# Patient Record
Sex: FEMALE | HISPANIC OR LATINO | URBAN - METROPOLITAN AREA
[De-identification: names, ages, dates, MRNs, and addresses within clinical notes are randomized per-mention and may not be internally consistent; named-entity substitution may affect disease eponyms.]

---

## 2024-06-21 ENCOUNTER — OFFICE VISIT (OUTPATIENT)
Dept: URBAN - METROPOLITAN AREA CLINIC 60 | Facility: CLINIC | Age: 49
End: 2024-06-21

## 2024-06-21 RX ORDER — OFLOXACIN 3 MG/ML
10 DROPS IN AFFECTED EAR TWICE A DAY X10 DAYS SOLUTION AURICULAR (OTIC)
Qty: 5 MILLILITER | Refills: 0 | Status: ACTIVE | COMMUNITY

## 2024-06-21 RX ORDER — CEFDINIR 300 MG/1
TAKE ONE CAPSULE BY MOUTH TWICE DAILY FOR 10 DAYS WITH FOOD CAPSULE ORAL
Qty: 20 EACH | Refills: 0 | Status: ACTIVE | COMMUNITY

## 2024-06-21 RX ORDER — PHENTERMINE HYDROCHLORIDE 37.5 MG/1
TAKE 1/2 TABLET BY MOUTH DAILY TABLET ORAL
Qty: 15 EACH | Refills: 0 | Status: ACTIVE | COMMUNITY

## 2024-06-21 RX ORDER — CLOTRIMAZOLE AND BETAMETHASONE DIPROPIONATE 10; .5 MG/G; MG/G
APPLY TO AFFECTED AREA TWICE DAILY FOR 14 DAYS CREAM TOPICAL
Qty: 15 GRAM | Refills: 0 | Status: ACTIVE | COMMUNITY

## 2024-06-21 RX ORDER — BENZONATATE 200 MG/1
TAKE 1 CAPSULE BY MOUTH EVERY 8 HOURS AS NEEDED FOR COUGH CAPSULE ORAL
Qty: 42 EACH | Refills: 0 | Status: ACTIVE | COMMUNITY

## 2024-06-21 RX ORDER — ERGOCALCIFEROL 1.25 MG/1
TAKE 1 CAPSULE BY MOUTH ONCE A WEEK FOR 12 WEEKS CAPSULE ORAL
Qty: 12 EACH | Refills: 0 | Status: ACTIVE | COMMUNITY

## 2024-06-21 RX ORDER — PHENTERMINE HYDROCHLORIDE 37.5 MG/1
TAKE 1 TABLET BY MOUTH DAILY TABLET ORAL
Qty: 30 EACH | Refills: 0 | Status: ACTIVE | COMMUNITY

## 2024-06-21 RX ORDER — TIRZEPATIDE 2.5 MG/.5ML
INJECT 2.5 MG/0.5 ML SUBCUTANEOUSLY ONCE A WEEK FOR 4 WEEKS INJECTION, SOLUTION SUBCUTANEOUS
Qty: 2 MILLILITER | Refills: 0 | Status: ACTIVE | COMMUNITY

## 2024-06-24 ENCOUNTER — OFFICE VISIT (OUTPATIENT)
Dept: URBAN - METROPOLITAN AREA CLINIC 60 | Facility: CLINIC | Age: 49
End: 2024-06-24

## 2024-06-26 ENCOUNTER — OFFICE VISIT (OUTPATIENT)
Dept: URBAN - METROPOLITAN AREA CLINIC 60 | Facility: CLINIC | Age: 49
End: 2024-06-26
Payer: COMMERCIAL

## 2024-06-26 ENCOUNTER — DASHBOARD ENCOUNTERS (OUTPATIENT)
Age: 49
End: 2024-06-26

## 2024-06-26 VITALS
HEIGHT: 63 IN | RESPIRATION RATE: 20 BRPM | WEIGHT: 163.8 LBS | OXYGEN SATURATION: 98 % | TEMPERATURE: 98.2 F | HEART RATE: 76 BPM | DIASTOLIC BLOOD PRESSURE: 68 MMHG | BODY MASS INDEX: 29.02 KG/M2 | SYSTOLIC BLOOD PRESSURE: 104 MMHG

## 2024-06-26 DIAGNOSIS — D50.8 OTHER IRON DEFICIENCY ANEMIA: ICD-10-CM

## 2024-06-26 DIAGNOSIS — Z12.11 COLON CANCER SCREENING: ICD-10-CM

## 2024-06-26 DIAGNOSIS — K59.09 OTHER CONSTIPATION: ICD-10-CM

## 2024-06-26 PROBLEM — 87522002: Status: ACTIVE | Noted: 2024-06-26

## 2024-06-26 PROCEDURE — 99204 OFFICE O/P NEW MOD 45 MIN: CPT

## 2024-06-26 RX ORDER — ERGOCALCIFEROL 1.25 MG/1
TAKE 1 CAPSULE BY MOUTH ONCE A WEEK FOR 12 WEEKS CAPSULE ORAL
Qty: 12 EACH | Refills: 0 | Status: ACTIVE | COMMUNITY

## 2024-06-26 RX ORDER — ANTIARTHRITIC COMBINATION NO.2 900 MG
1 TABLET TABLET ORAL ONCE A DAY
Status: ACTIVE | COMMUNITY

## 2024-06-26 RX ORDER — MULTIVITAMIN
1 TABLET TABLET ORAL ONCE A DAY
Status: ACTIVE | COMMUNITY

## 2024-06-26 RX ORDER — POLYETHYLENE GLYCOL 3350, SODIUM CHLORIDE, SODIUM BICARBONATE, POTASSIUM CHLORIDE 420; 11.2; 5.72; 1.48 G/4L; G/4L; G/4L; G/4L
AS DIRECTED POWDER, FOR SOLUTION ORAL ONCE
Qty: 4000 | Refills: 0 | OUTPATIENT
Start: 2024-06-26 | End: 2024-06-27

## 2024-06-26 NOTE — HPI-TODAY'S VISIT:
Patient is a 49-year-old female referred by PCP for chronic constipation and colon cancer screening.  Patient with past medical history of hysterectomy, gastric bypass, tubal ligation, tummy tuck, iron deficiency anemia, ovarian cancer stage I in 2020. Labs from 4/2024 with hgb 11.0, iron 31 and ferritin 5. Previous labs 7/2023 with hgb 10.1. She previously had iron infusions in [place] with hematologist but has not established with one here. Her last infusions years ago.   She was having issues with brbpr that has resolved the last few weeks. Long term history of constipation previously failed miralax, dulcolax, fiber and colace. Currently having a BM every few days. She has associted lower abdominal bloating. No prior colonoscopy. Also she has occasional dyspepsia and nausea depending on her eating habits.  Denies any use of blood thinners, pacemaker or defib. No cardiac or pulmonary issues.   Denies any brbpr, melena,  unintentional weight loss, early satiety, fever, chills, diarrhea, dysphagia, odynophagia, or reflux.

## 2024-07-03 ENCOUNTER — LAB OUTSIDE AN ENCOUNTER (OUTPATIENT)
Dept: URBAN - METROPOLITAN AREA CLINIC 60 | Facility: CLINIC | Age: 49
End: 2024-07-03

## 2024-07-10 ENCOUNTER — TELEPHONE ENCOUNTER (OUTPATIENT)
Dept: URBAN - METROPOLITAN AREA CLINIC 63 | Facility: CLINIC | Age: 49
End: 2024-07-10

## 2024-07-29 ENCOUNTER — CLAIMS CREATED FROM THE CLAIM WINDOW (OUTPATIENT)
Dept: URBAN - METROPOLITAN AREA SURGERY CENTER 4 | Facility: SURGERY CENTER | Age: 49
End: 2024-07-29
Payer: COMMERCIAL

## 2024-07-29 DIAGNOSIS — K64.1 SECOND DEGREE HEMORRHOIDS: ICD-10-CM

## 2024-07-29 DIAGNOSIS — Z12.11 ENCOUNTER FOR SCREENING FOR MALIGNANT NEOPLASM OF COLON: ICD-10-CM

## 2024-07-29 DIAGNOSIS — D50.9 IRON DEFICIENCY ANEMIA, UNSPECIFIED IRON DEFICIENCY ANEMIA TYPE: ICD-10-CM

## 2024-07-29 DIAGNOSIS — K57.30 DIVERTICULOSIS OF LARGE INTESTINE WITHOUT PERFORATION OR ABSCESS WITHOUT BLEEDING: ICD-10-CM

## 2024-07-29 DIAGNOSIS — Z98.84 HISTORY OF ROUX-EN-Y GASTRIC BYPASS: ICD-10-CM

## 2024-07-29 PROCEDURE — 43239 EGD BIOPSY SINGLE/MULTIPLE: CPT | Performed by: INTERNAL MEDICINE

## 2024-07-29 PROCEDURE — G0121 COLON CA SCRN NOT HI RSK IND: HCPCS | Performed by: INTERNAL MEDICINE

## 2024-07-29 RX ORDER — ERGOCALCIFEROL 1.25 MG/1
TAKE 1 CAPSULE BY MOUTH ONCE A WEEK FOR 12 WEEKS CAPSULE ORAL
Qty: 12 EACH | Refills: 0 | Status: ACTIVE | COMMUNITY

## 2024-07-29 RX ORDER — ANTIARTHRITIC COMBINATION NO.2 900 MG
1 TABLET TABLET ORAL ONCE A DAY
Status: ACTIVE | COMMUNITY

## 2024-07-29 RX ORDER — MULTIVITAMIN
1 TABLET TABLET ORAL ONCE A DAY
Status: ACTIVE | COMMUNITY

## 2024-10-07 ENCOUNTER — OFFICE VISIT (OUTPATIENT)
Dept: URBAN - METROPOLITAN AREA CLINIC 60 | Facility: CLINIC | Age: 49
End: 2024-10-07

## 2024-10-07 RX ORDER — ANTIARTHRITIC COMBINATION NO.2 900 MG
1 TABLET TABLET ORAL ONCE A DAY
COMMUNITY

## 2024-10-07 RX ORDER — MULTIVITAMIN
1 TABLET TABLET ORAL ONCE A DAY
COMMUNITY

## 2024-10-07 RX ORDER — ERGOCALCIFEROL 1.25 MG/1
TAKE 1 CAPSULE BY MOUTH ONCE A WEEK FOR 12 WEEKS CAPSULE ORAL
Qty: 12 EACH | Refills: 0 | COMMUNITY

## 2024-10-15 ENCOUNTER — OFFICE VISIT (OUTPATIENT)
Dept: URBAN - METROPOLITAN AREA CLINIC 60 | Facility: CLINIC | Age: 49
End: 2024-10-15

## 2024-10-15 RX ORDER — ERGOCALCIFEROL 1.25 MG/1
TAKE 1 CAPSULE BY MOUTH ONCE A WEEK FOR 12 WEEKS CAPSULE ORAL
Qty: 12 EACH | Refills: 0 | COMMUNITY

## 2024-10-15 RX ORDER — MULTIVITAMIN
1 TABLET TABLET ORAL ONCE A DAY
COMMUNITY

## 2024-10-15 RX ORDER — ANTIARTHRITIC COMBINATION NO.2 900 MG
1 TABLET TABLET ORAL ONCE A DAY
COMMUNITY

## 2024-10-23 ENCOUNTER — OFFICE VISIT (OUTPATIENT)
Dept: URBAN - METROPOLITAN AREA CLINIC 60 | Facility: CLINIC | Age: 49
End: 2024-10-23

## 2024-10-23 RX ORDER — ANTIARTHRITIC COMBINATION NO.2 900 MG
1 TABLET TABLET ORAL ONCE A DAY
COMMUNITY

## 2024-10-23 RX ORDER — MULTIVITAMIN
1 TABLET TABLET ORAL ONCE A DAY
COMMUNITY

## 2024-10-23 RX ORDER — ERGOCALCIFEROL 1.25 MG/1
TAKE 1 CAPSULE BY MOUTH ONCE A WEEK FOR 12 WEEKS CAPSULE ORAL
Qty: 12 EACH | Refills: 0 | COMMUNITY

## 2024-10-29 ENCOUNTER — OFFICE VISIT (OUTPATIENT)
Dept: URBAN - METROPOLITAN AREA CLINIC 60 | Facility: CLINIC | Age: 49
End: 2024-10-29

## 2024-10-29 RX ORDER — MULTIVITAMIN
1 TABLET TABLET ORAL ONCE A DAY
COMMUNITY

## 2024-10-29 RX ORDER — ANTIARTHRITIC COMBINATION NO.2 900 MG
1 TABLET TABLET ORAL ONCE A DAY
COMMUNITY

## 2024-10-29 RX ORDER — ERGOCALCIFEROL 1.25 MG/1
TAKE 1 CAPSULE BY MOUTH ONCE A WEEK FOR 12 WEEKS CAPSULE ORAL
Qty: 12 EACH | Refills: 0 | COMMUNITY

## 2024-10-30 ENCOUNTER — OFFICE VISIT (OUTPATIENT)
Dept: URBAN - METROPOLITAN AREA CLINIC 60 | Facility: CLINIC | Age: 49
End: 2024-10-30

## 2024-10-30 ENCOUNTER — OFFICE VISIT (OUTPATIENT)
Dept: URBAN - METROPOLITAN AREA CLINIC 63 | Facility: CLINIC | Age: 49
End: 2024-10-30

## 2024-10-30 VITALS
WEIGHT: 168 LBS | DIASTOLIC BLOOD PRESSURE: 70 MMHG | TEMPERATURE: 97.8 F | HEART RATE: 97 BPM | BODY MASS INDEX: 29.77 KG/M2 | OXYGEN SATURATION: 98 % | HEIGHT: 63 IN | SYSTOLIC BLOOD PRESSURE: 105 MMHG

## 2024-10-30 RX ORDER — ERGOCALCIFEROL 1.25 MG/1
TAKE 1 CAPSULE BY MOUTH ONCE A WEEK FOR 12 WEEKS CAPSULE ORAL
Qty: 12 EACH | Refills: 0 | COMMUNITY

## 2024-10-30 RX ORDER — ANTIARTHRITIC COMBINATION NO.2 900 MG
1 TABLET TABLET ORAL ONCE A DAY
COMMUNITY

## 2024-10-30 RX ORDER — MULTIVITAMIN
1 TABLET TABLET ORAL ONCE A DAY
COMMUNITY

## 2024-10-30 NOTE — HPI-ZZZTODAY'S VISIT
Patient is a very pleasant 49-year-old female who presents for follow-up.  She is a patient of Dr. Ott.  Liz Martinez PA-C last saw her on 6/26/2024.  Past medical history significant for vitamin D deficiency, hysterectomy, gastric bypass, tubal ligation, tummy tuck, iron deficiency anemia, ovarian cancer stage I in 2020.  Past surgical history reviewed.  Last colonoscopy and endoscopy 7/29/2024. Previously patient established for chronic constipation.  She is having issues with bright red blood per rectum that resolved prior to office visit and history of constipation.  Previously failed MiraLAX, Dulcolax, fiber, Colace.  Started on Linzess 145 mcg at last office visit. Also has history of iron deficiency anemia.  Labs April 2024 demonstrating hemoglobin 11.0, iron 31, ferritin 5.  Previous labs from July 2023 with hemoglobin of 10.0.  Previously had iron infusions in [place] with hematologist.  Rosario established with hematologist here.  Has not required infusion in several years. Referral to [place] [facility] for [facility] with history of gastric bypass.

## 2024-10-30 NOTE — HPI-PREVIOUS PROCEDURES
Endoscopy 7/29/2024 consistent with normal esophagus Leo-en-Y gastrojejunostomy with gastrojejunal anastomosis characterized by healthy-appearing mucosa Normal examined duodenum Jejunal biopsy consistent with small bowel mucosa with normal villous pattern Gastric pouch biopsy consistent with mild chronic inflammation negative for H. pylori.  Colonoscopy 7/29/2024 consistent with decreased finger tone (grade 2 hemorrhoids) Examined portion of ileum normal Diverticulosis in rectosigmoid and sigmoid colon Examination otherwise normal no specimens collected Recall for 10 years.

## 2024-10-30 NOTE — HPI-PREVIOUS LABS
10/16/2024 CMP within normal limits, CBC within normal limits, iron panel significant for total iron within normal limits at 159, percent saturation 47, ferritin within normal limits at 222, lipid panel within normal limits, A1c within normal limits at 5.5

## 2025-01-09 ENCOUNTER — TELEPHONE ENCOUNTER (OUTPATIENT)
Dept: URBAN - METROPOLITAN AREA CLINIC 63 | Facility: CLINIC | Age: 50
End: 2025-01-09

## 2025-01-29 ENCOUNTER — APPOINTMENT (OUTPATIENT)
Dept: URBAN - METROPOLITAN AREA CLINIC 335 | Facility: CLINIC | Age: 50
Setting detail: DERMATOLOGY
End: 2025-01-29

## 2025-01-29 DIAGNOSIS — B07.8 OTHER VIRAL WARTS: ICD-10-CM | Status: INADEQUATELY CONTROLLED

## 2025-01-29 DIAGNOSIS — Z41.9 ENCOUNTER FOR PROCEDURE FOR PURPOSES OTHER THAN REMEDYING HEALTH STATE, UNSPECIFIED: ICD-10-CM

## 2025-01-29 DIAGNOSIS — L64.8 OTHER ANDROGENIC ALOPECIA: ICD-10-CM | Status: INADEQUATELY CONTROLLED

## 2025-01-29 DIAGNOSIS — L20.89 OTHER ATOPIC DERMATITIS: ICD-10-CM | Status: INADEQUATELY CONTROLLED

## 2025-01-29 PROBLEM — L30.9 DERMATITIS, UNSPECIFIED: Status: ACTIVE | Noted: 2025-01-29

## 2025-01-29 PROCEDURE — 17110 DESTRUCTION B9 LES UP TO 14: CPT

## 2025-01-29 PROCEDURE — ? SUNSCREEN RECOMMENDATIONS

## 2025-01-29 PROCEDURE — ? COSMETIC QUOTE

## 2025-01-29 PROCEDURE — ? PRODUCT LINE (REVISION)

## 2025-01-29 PROCEDURE — ? ADDITIONAL NOTES

## 2025-01-29 PROCEDURE — ? RECOMMENDATIONS

## 2025-01-29 PROCEDURE — 99204 OFFICE O/P NEW MOD 45 MIN: CPT | Mod: 25

## 2025-01-29 PROCEDURE — ? LIQUID NITROGEN

## 2025-01-29 PROCEDURE — ? ORDER TESTS

## 2025-01-29 PROCEDURE — ? COUNSELING

## 2025-01-29 PROCEDURE — ? PRESCRIPTION

## 2025-01-29 PROCEDURE — ? COSMETIC CONSULTATION: PRP

## 2025-01-29 RX ORDER — KETOCONAZOLE 20 MG/G
CREAM TOPICAL
Qty: 30 | Refills: 1 | Status: ERX | COMMUNITY
Start: 2025-01-29

## 2025-01-29 RX ORDER — IMIQUIMOD 12.5 MG/.25G
CREAM TOPICAL
Qty: 12 | Refills: 2 | Status: ERX | COMMUNITY
Start: 2025-01-29

## 2025-01-29 RX ORDER — CLOBETASOL PROPIONATE 0.5 MG/ML
SOLUTION TOPICAL
Qty: 150 | Refills: 3 | Status: ERX | COMMUNITY
Start: 2025-01-29

## 2025-01-29 RX ORDER — TRIAMCINOLONE ACETONIDE 1 MG/G
CREAM TOPICAL
Qty: 80 | Refills: 2 | Status: ERX | COMMUNITY
Start: 2025-01-29

## 2025-01-29 RX ADMIN — KETOCONAZOLE: 20 CREAM TOPICAL at 00:00

## 2025-01-29 RX ADMIN — CLOBETASOL PROPIONATE: 0.5 SOLUTION TOPICAL at 00:00

## 2025-01-29 RX ADMIN — TRIAMCINOLONE ACETONIDE: 1 CREAM TOPICAL at 00:00

## 2025-01-29 RX ADMIN — IMIQUIMOD: 12.5 CREAM TOPICAL at 00:00

## 2025-01-29 ASSESSMENT — LOCATION DETAILED DESCRIPTION DERM
LOCATION DETAILED: LEFT LATERAL PLANTAR HEEL
LOCATION DETAILED: LEFT DISTAL RADIAL PALMAR INDEX FINGER
LOCATION DETAILED: LEFT PLANTAR FOREFOOT OVERLYING 5TH METATARSAL
LOCATION DETAILED: RIGHT ARCH
LOCATION DETAILED: RIGHT PROXIMAL DORSAL INDEX FINGER
LOCATION DETAILED: LEFT HYPOTHENAR EMINENCE
LOCATION DETAILED: RIGHT DISTAL PALMAR SMALL FINGER
LOCATION DETAILED: LEFT THENAR EMINENCE
LOCATION DETAILED: LEFT PROXIMAL RADIAL PALMAR INDEX FINGER
LOCATION DETAILED: RIGHT ANKLE
LOCATION DETAILED: LEFT RADIAL PALM
LOCATION DETAILED: RIGHT LATERAL PLANTAR MIDFOOT
LOCATION DETAILED: MEDIAL FRONTAL SCALP
LOCATION DETAILED: RIGHT 5TH TOE
LOCATION DETAILED: RIGHT THENAR EMINENCE

## 2025-01-29 ASSESSMENT — LOCATION SIMPLE DESCRIPTION DERM
LOCATION SIMPLE: LEFT HAND
LOCATION SIMPLE: RIGHT INDEX FINGER
LOCATION SIMPLE: RIGHT HAND
LOCATION SIMPLE: RIGHT 5TH TOE
LOCATION SIMPLE: LEFT PLANTAR SURFACE
LOCATION SIMPLE: RIGHT SMALL FINGER
LOCATION SIMPLE: RIGHT PLANTAR SURFACE
LOCATION SIMPLE: FRONTAL SCALP
LOCATION SIMPLE: RIGHT ANKLE
LOCATION SIMPLE: LEFT INDEX FINGER

## 2025-01-29 ASSESSMENT — LOCATION ZONE DERM
LOCATION ZONE: SCALP
LOCATION ZONE: FINGER
LOCATION ZONE: TOE
LOCATION ZONE: FEET
LOCATION ZONE: HAND
LOCATION ZONE: LEG

## 2025-01-29 NOTE — PROCEDURE: LIQUID NITROGEN
Add 52 Modifier (Optional): no
Consent: The patient's written consent was obtained including but not limited to risks of crusting, scabbing, blistering, scarring, darker or lighter pigmentary change, recurrence, incomplete removal and infection.
Show Topical Anesthesia Variable?: Yes
Application Tool (Optional): Liquid Nitrogen Sprayer
Number Of Freeze-Thaw Cycles: 2 freeze-thaw cycles
Medical Necessity Clause: This procedure was medically necessary because the lesions that were treated were:
Medical Necessity Information: It is in your best interest to select a reason for this procedure from the list below. All of these items fulfill various CMS LCD requirements except the new and changing color options.
Post-Care Instructions: I reviewed with the patient in detail post-care instructions. Patient is to wear sunprotection, and avoid picking at any of the treated lesions. Pt may apply Vaseline to crusted or scabbing areas.
Detail Level: Simple
Spray Paint Text: The liquid nitrogen was applied to the skin utilizing a spray paint frosting technique.
Duration Of Freeze Thaw-Cycle (Seconds): 3

## 2025-01-29 NOTE — PROCEDURE: COSMETIC QUOTE
Injectable  18 Units: 0
Injectable  11 Price/Unit (In Dollars- Use Only Numbers And Decimals): 375
Implant 3 Price/Unit (In Dollars- Use Only Numbers And Decimals): 0.00
Injectable 8 Price/Unit (In Dollars- Use Only Numbers And Decimals): 700
Injectable 1: Botox
Injectable  12: Juvederm Voluma
Injectable 1 Units: 44
Injectable 5 Price/Unit (In Dollars- Use Only Numbers And Decimals): 750.00
Injectable 9: Juvederm Ultra Plus
Detail Level: Zone
Discount 1 Name: First time patient
Injectable 2 Holder/Unit (In Dollars- Use Only Numbers And Decimals): 10.00
Injectable 9 Units: 2
Include Sales Tax: No
Injectable 10: Amita Berrios
Global Percentage Discount (Will Not Be Applied To Implants Or Outside Services): 15
Injectable 7: RHA 4
Injectable  14 Price/Unit (In Dollars- Use Only Numbers And Decimals): 700.00
Injectable 4: RHA Redensity
Injectable 8: Juvéderm Ultra XC
Facility Fee Units (Optional): 1
Injectable 1 Price/Unit (In Dollars- Use Only Numbers And Decimals): 13.00
Injectable 5: RHA 2
Injectable  12 Price/Unit (In Dollars- Use Only Numbers And Decimals): 900.00
Injectable 9 Price/Unit (In Dollars- Use Only Numbers And Decimals): 650.00
Injectable 2: Dysport
Injectable  13: Sculptra
Injectable 6: RHA 3
Injectable 3: Daxxify
Injectable  14: Kybella
Include Sales Tax On Surgeon's Fees: Yes
Injectable  11: Skin Vive

## 2025-01-29 NOTE — PROCEDURE: SUNSCREEN RECOMMENDATIONS
Products Recommended: Elta MD sunscreen every 2 hours when exposed to the sun. Mineral Base with zinc ant titanium
General Sunscreen Counseling: I recommended a broad spectrum sunscreen with a SPF of 30 or higher.  I explained that SPF 30 sunscreens block approximately 97 percent of the sun's harmful rays.  Sunscreens should be applied at least 15 minutes prior to expected sun exposure and then every 2 hours after that as long as sun exposure continues. If swimming or exercising sunscreen should be reapplied every 45 minutes to an hour after getting wet or sweating.  One ounce, or the equivalent of a shot glass full of sunscreen, is adequate to protect the skin not covered by a bathing suit. I also recommended a lip balm with a sunscreen as well. Sun protective clothing can be used in lieu of sunscreen but must be worn the entire time you are exposed to the sun's rays.
Detail Level: Generalized

## 2025-01-29 NOTE — PROCEDURE: PRODUCT LINE (REVISION)
Product 10 Price (In Dollars - Numeric Only, No Special Characters Or $): 98.00
Product 16 Application Directions: Glide onto lips in a massaging motion. Use three times daily for optimal results or as needed. Use alone or layered over your favorite lip color.
Product 29 Price (In Dollars - Numeric Only, No Special Characters Or $): 0.00
Product 19 Price (In Dollars - Numeric Only, No Special Characters Or $): 184
Product 31 Units: 0
Product 2 Application Directions: Wet face with tepid water. Dispense a dime-sized amount into palm of hand. Lather in hands. Using fingertips, gently massage onto face using circular motions. Avoid eye area. Rinse thoroughly and pat skin dry. Can be used once or twice daily as tolerated.
Product 7 Application Directions: Wet face with tepid water. Dispense a quarter-sized amount into palm of hand. Using fingertips, gently massage onto face using circular motions. Rinse thoroughly and pat skin dry. Use twice daily, morning and evening
Product 5 Price (In Dollars - Numeric Only, No Special Characters Or $): 114.00
Render Product Pricing In Note: Yes
Name Of Product 13: Retinol Complete 1.0
Name Of Product 17: Gentle foaming cleanser
Product 19 Units: 1
Name Of Product 3: C+ Brightening Eye Complex
Name Of Product 8: Hydrating Serum
Product 5 Application Directions: Gently dispense up to one pump and dot around eye area. Massage product onto skin using cooling metal applicator. Tap in excess product with finger. Avoid direct eye contact. Use twice daily
Product 10 Application Directions: Dispense one or more pumps and gently apply onto décolletage. Using upward strokes, work your way up to the neck and jawline. Use twice daily.
Product 13 Price (In Dollars - Numeric Only, No Special Characters Or $): 127.00
Product 17 Price (In Dollars - Numeric Only, No Special Characters Or $): 50
Product 8 Price (In Dollars - Numeric Only, No Special Characters Or $): 100
Name Of Product 1: Kennedyirm
Product 3 Price (In Dollars - Numeric Only, No Special Characters Or $): 120
Product 13 Application Directions: Use only at night. After cleansing, dispense one to two pumps on back of hand. Apply to face two to three times per week, avoiding the eye area. Increase usage as tolerated
Name Of Product 6: DEJ Boosting Serum
Name Of Product 11: Pumpkin Enzyme Mask
Name Of Product 15: Soothing Facial Rinse
Product 8 Application Directions: Apply to clean skin. Dispense two pumps into palm of hand and gently apply to face. Avoid eye area. Use twice daily. Can be used alone or as a moisture booster under creams or lotions.
Product 1 Price (In Dollars - Numeric Only, No Special Characters Or $): 157.50
Product 17 Application Directions: Use one pump up to twice daily, work into skin and rinse thoroughly
Detail Level: Zone
Product 6 Price (In Dollars - Numeric Only, No Special Characters Or $): 225.00
Name Of Product 14: Revox 7
Product 14 Application Directions: Apply to clean skin prior to moisturizer. Dispense desired amount and massage serum onto any fine lines and wrinkles. Avoid direct eye contact. Use twice daily. NOTE: A thin layer of product is all that is needed
Product 11 Price (In Dollars - Numeric Only, No Special Characters Or $): 52.00
Product 3 Application Directions: Dispense a small amount onto cooling metal applicator and massage outward in a circular motion onto under-eye area only. Tap in excess product with finger. Avoid direct eye contact. Use twice daily.
Product 15 Price (In Dollars - Numeric Only, No Special Characters Or $): 40.00
Name Of Product 9: Intellishade TruPhysical
Name Of Product 18: Papaya enzyme cleanser
Product 14 Price (In Dollars - Numeric Only, No Special Characters Or $): 150
Product 1 Application Directions: Using a circular motion, massage into skin until fully absorbed. Apply twice daily. To see optimal results, it is recommended to exfoliate twice a week using a loofah or physical exfoliator.
Name Of Product 4: C+ Correcting Complex 30%
Assigning Risk Information: Per AMA, level of risk is based upon consequences of the problem(s) addressed at the encounter when appropriately treated. Risk also includes medical decision making related to the need to initiate or forego further testing, treatment and/or hospitalization. Over the counter medication are assigned a risk level of low. Prescription medication management is assigned a risk level of moderate.
Product 11 Application Directions: Using fingertips, apply a generous amount onto dry skin, avoiding eye area. Lightly scrub in the mask using circular motions and moving outward to stimulate gentle exfoliation. Leave on for 15-20 minutes. Remove with warm water and if needed, a washcloth. Pat skin dry. Use 1-3 times per week.
Name Of Product 2: Brightening face wash
Product 15 Application Directions: After cleansing, saturate a cotton pad with toner. Gently swipe across face. Avoid eye area. Use twice daily
Product 4 Price (In Dollars - Numeric Only, No Special Characters Or $): 185
Product 18 Price (In Dollars - Numeric Only, No Special Characters Or $): 44
Product 9 Price (In Dollars - Numeric Only, No Special Characters Or $): 80.00
Product 6 Application Directions: Used twice daily on the full face, including the total eye area.
Risk Of Complication Category: No MDM
Name Of Product 12: Retinol Complete 0.5
Name Of Product 16: YouthFull Lip Replenisher
Product 9 Application Directions: Apply evenly to face as the last step in your morning skincare routine. Wear alone or under makeup. Apply liberally 15 minutes prior to sun exposure (See Drug Fact(s) Panels on cartons.). Can be used with Vitamin C Lotion 15% or 30% for added antioxidant benefits
Name Of Product 7: Gentle Cleansing Lotion
Product 4 Application Directions: Use morning and evening after cleansing, but before moisturizing. Dispense one pump into palm of hand and apply evenly to the face, avoiding the eye area. Be sure to follow with SPF
Product 12 Price (In Dollars - Numeric Only, No Special Characters Or $): 104.00
Product 16 Price (In Dollars - Numeric Only, No Special Characters Or $): 38.00
Name Of Product 10: Nectifirm
Name Of Product 19: Revox line relaxer
Name Of Product 5: DEJ Eye Cream
Product 12 Application Directions: Use only at night. After cleansing, dispense one to two pumps on back of hand. Apply to face two to three times per week, avoiding the eye area. Increase usage as tolerated.

## 2025-01-29 NOTE — PROCEDURE: ADDITIONAL NOTES
Render Risk Assessment In Note?: no
Additional Notes: Spoke with patient about arnica + Vit K for before and after injection to help with bruising
Detail Level: Zone

## 2025-01-29 NOTE — PROCEDURE: RECOMMENDATIONS
Recommendations (Free Text): Xtrese gummies. Proscriptix shampoo, conditioner and serum.
Recommendation Preamble: The following recommendations were made during the visit: Rogaine 5%
Detail Level: Simple
Render Risk Assessment In Note?: no

## 2025-02-04 ENCOUNTER — TELEPHONE ENCOUNTER (OUTPATIENT)
Dept: URBAN - METROPOLITAN AREA CLINIC 57 | Facility: CLINIC | Age: 50
End: 2025-02-04

## 2025-04-24 ENCOUNTER — OFFICE VISIT (OUTPATIENT)
Dept: URBAN - METROPOLITAN AREA CLINIC 63 | Facility: CLINIC | Age: 50
End: 2025-04-24